# Patient Record
Sex: MALE | Race: WHITE | NOT HISPANIC OR LATINO | Employment: OTHER | URBAN - METROPOLITAN AREA
[De-identification: names, ages, dates, MRNs, and addresses within clinical notes are randomized per-mention and may not be internally consistent; named-entity substitution may affect disease eponyms.]

---

## 2017-01-25 ENCOUNTER — PREPPED CHART (OUTPATIENT)
Dept: URBAN - METROPOLITAN AREA CLINIC 43 | Facility: CLINIC | Age: 77
End: 2017-01-25

## 2018-01-31 ENCOUNTER — ESTABLISHED COMPREHENSIVE EXAM (OUTPATIENT)
Dept: URBAN - METROPOLITAN AREA CLINIC 43 | Facility: CLINIC | Age: 78
End: 2018-01-31

## 2018-01-31 DIAGNOSIS — H25.9: ICD-10-CM

## 2018-01-31 DIAGNOSIS — H35.362: ICD-10-CM

## 2018-01-31 DIAGNOSIS — H00.023: ICD-10-CM

## 2018-01-31 DIAGNOSIS — H00.026: ICD-10-CM

## 2018-01-31 PROCEDURE — G8427 DOCREV CUR MEDS BY ELIG CLIN: HCPCS

## 2018-01-31 PROCEDURE — 92015 DETERMINE REFRACTIVE STATE: CPT

## 2018-01-31 PROCEDURE — 1036F TOBACCO NON-USER: CPT

## 2018-01-31 PROCEDURE — 92014 COMPRE OPH EXAM EST PT 1/>: CPT

## 2018-01-31 ASSESSMENT — VISUAL ACUITY
OD_SC: 20/100
OS_BAT: 20/400
OD_CC: J4
OS_CC: J8
OS_CC: 20/70-2
OD_CC: 20/50-2
OS_SC: 20/100
OD_PH: 20/40-2
OD_BAT: 20/70
OS_PH: 20/60+1

## 2018-01-31 ASSESSMENT — TONOMETRY
OD_IOP_MMHG: 17
OS_IOP_MMHG: 15

## 2019-02-06 ENCOUNTER — ESTABLISHED COMPREHENSIVE EXAM (OUTPATIENT)
Dept: URBAN - METROPOLITAN AREA CLINIC 43 | Facility: CLINIC | Age: 79
End: 2019-02-06

## 2019-02-06 DIAGNOSIS — H43.813: ICD-10-CM

## 2019-02-06 DIAGNOSIS — H25.9: ICD-10-CM

## 2019-02-06 DIAGNOSIS — H02.883: ICD-10-CM

## 2019-02-06 DIAGNOSIS — H02.886: ICD-10-CM

## 2019-02-06 DIAGNOSIS — H40.013: ICD-10-CM

## 2019-02-06 DIAGNOSIS — H35.362: ICD-10-CM

## 2019-02-06 PROCEDURE — 92015 DETERMINE REFRACTIVE STATE: CPT

## 2019-02-06 PROCEDURE — 92014 COMPRE OPH EXAM EST PT 1/>: CPT

## 2019-02-06 PROCEDURE — 92133 CPTRZD OPH DX IMG PST SGM ON: CPT

## 2019-02-06 ASSESSMENT — VISUAL ACUITY
OD_CC: J3-
OS_SC: J12
OD_CC: 20/80-2
OD_BAT: 20/400
OS_CC: J8
OS_CC: 20/60-1
OD_PH: 20/50-2
OS_SC: 20/80-2
OD_SC: 20/100
OD_SC: J12-

## 2019-02-06 ASSESSMENT — TONOMETRY
OS_IOP_MMHG: 17
OD_IOP_MMHG: 18

## 2020-02-07 ENCOUNTER — ESTABLISHED COMPREHENSIVE EXAM (OUTPATIENT)
Dept: URBAN - METROPOLITAN AREA CLINIC 43 | Facility: CLINIC | Age: 80
End: 2020-02-07

## 2020-02-07 DIAGNOSIS — H40.013: ICD-10-CM

## 2020-02-07 DIAGNOSIS — H25.9: ICD-10-CM

## 2020-02-07 DIAGNOSIS — H43.813: ICD-10-CM

## 2020-02-07 DIAGNOSIS — H35.362: ICD-10-CM

## 2020-02-07 DIAGNOSIS — H02.886: ICD-10-CM

## 2020-02-07 DIAGNOSIS — H02.883: ICD-10-CM

## 2020-02-07 DIAGNOSIS — H52.03: ICD-10-CM

## 2020-02-07 PROCEDURE — 92015 DETERMINE REFRACTIVE STATE: CPT

## 2020-02-07 PROCEDURE — 92014 COMPRE OPH EXAM EST PT 1/>: CPT

## 2020-02-07 ASSESSMENT — VISUAL ACUITY
OD_SC: >J12
OS_SC: 20/200
OS_SC: >J12
OD_PH: 20/40
OS_CC: 20/60-1
OD_CC: J4
OD_CC: 20/50-2
OD_SC: 20/100
OS_CC: J6

## 2020-02-07 ASSESSMENT — TONOMETRY
OS_IOP_MMHG: 15
OD_IOP_MMHG: 16

## 2021-03-05 ENCOUNTER — ESTABLISHED COMPREHENSIVE EXAM (OUTPATIENT)
Dept: URBAN - METROPOLITAN AREA CLINIC 43 | Facility: CLINIC | Age: 81
End: 2021-03-05

## 2021-03-05 DIAGNOSIS — H35.362: ICD-10-CM

## 2021-03-05 DIAGNOSIS — H43.813: ICD-10-CM

## 2021-03-05 DIAGNOSIS — H52.03: ICD-10-CM

## 2021-03-05 DIAGNOSIS — H25.9: ICD-10-CM

## 2021-03-05 DIAGNOSIS — H40.013: ICD-10-CM

## 2021-03-05 DIAGNOSIS — H02.883: ICD-10-CM

## 2021-03-05 DIAGNOSIS — H02.886: ICD-10-CM

## 2021-03-05 PROCEDURE — 92014 COMPRE OPH EXAM EST PT 1/>: CPT

## 2021-03-05 PROCEDURE — 92015 DETERMINE REFRACTIVE STATE: CPT

## 2021-03-05 ASSESSMENT — VISUAL ACUITY
OD_CC: 20/70
OS_CC: 20/70
OD_SC: 20/200
OS_SC: >J12
OS_SC: 20/100
OD_CC: J2
OD_SC: >J12
OS_CC: J8

## 2021-03-05 ASSESSMENT — TONOMETRY
OD_IOP_MMHG: 14
OS_IOP_MMHG: 14

## 2022-01-24 NOTE — PATIENT DISCUSSION
Cataract surgery has been performed in the first eye and activities of daily living are still impaired. The patient would like to proceed with cataract surgery in the second eye as scheduled. The patient elects BV+ OS, goal of emmetropia.

## 2022-02-11 NOTE — PATIENT DISCUSSION
1 Week PO: Patient is doing well post-operatively. The importance of post-op drop compliance was emphasized. Drop schedule reviewed with patient. Patient to call if any visual changes or concerns.

## 2022-03-11 ENCOUNTER — COMPREHENSIVE EXAM (OUTPATIENT)
Dept: URBAN - METROPOLITAN AREA CLINIC 43 | Facility: CLINIC | Age: 82
End: 2022-03-11

## 2022-03-11 DIAGNOSIS — H25.9: ICD-10-CM

## 2022-03-11 DIAGNOSIS — H02.883: ICD-10-CM

## 2022-03-11 DIAGNOSIS — H02.886: ICD-10-CM

## 2022-03-11 DIAGNOSIS — H35.362: ICD-10-CM

## 2022-03-11 DIAGNOSIS — H43.813: ICD-10-CM

## 2022-03-11 DIAGNOSIS — H40.013: ICD-10-CM

## 2022-03-11 DIAGNOSIS — H52.03: ICD-10-CM

## 2022-03-11 PROCEDURE — 92014 COMPRE OPH EXAM EST PT 1/>: CPT

## 2022-03-11 PROCEDURE — 92015 DETERMINE REFRACTIVE STATE: CPT

## 2022-03-11 ASSESSMENT — TONOMETRY
OD_IOP_MMHG: 19
OS_IOP_MMHG: 18

## 2022-03-11 ASSESSMENT — VISUAL ACUITY
OS_PH: 20/70-1
OU_CC: 20/70+2
OS_CC: 20/60-1
OD_CC: 20/100-1
OD_CC: J1
OD_BAT: >20/400
OS_BAT: >20/400
OS_CC: J10
OS_SC: 20/100
OD_SC: 20/100+1
OD_PH: 20/70-1

## 2024-03-06 ENCOUNTER — COMPREHENSIVE EXAM (OUTPATIENT)
Dept: URBAN - METROPOLITAN AREA CLINIC 43 | Facility: CLINIC | Age: 84
End: 2024-03-06

## 2024-03-06 DIAGNOSIS — H40.013: ICD-10-CM

## 2024-03-06 DIAGNOSIS — H25.9: ICD-10-CM

## 2024-03-06 DIAGNOSIS — H02.886: ICD-10-CM

## 2024-03-06 DIAGNOSIS — H35.362: ICD-10-CM

## 2024-03-06 DIAGNOSIS — H43.813: ICD-10-CM

## 2024-03-06 DIAGNOSIS — H02.883: ICD-10-CM

## 2024-03-06 DIAGNOSIS — H52.03: ICD-10-CM

## 2024-03-06 PROCEDURE — 92015 DETERMINE REFRACTIVE STATE: CPT

## 2024-03-06 PROCEDURE — 92014 COMPRE OPH EXAM EST PT 1/>: CPT

## 2024-03-06 ASSESSMENT — VISUAL ACUITY
OS_SC: >J12
OS_PH: 20/60
OS_CC: J12
OU_CC: 20/50-2
OU_SC: 20/60+1
OS_BAT: 20/400
OD_SC: 20/60
OS_CC: 20/80
OS_SC: 20/200+1
OD_CC: J4
OD_SC: >J12
OD_BAT: 20/400
OU_CC: J3
OD_CC: 20/50+2

## 2024-03-06 ASSESSMENT — TONOMETRY
OS_IOP_MMHG: 15
OD_IOP_MMHG: 15

## 2025-07-03 ENCOUNTER — COMPREHENSIVE EXAM (OUTPATIENT)
Age: 85
End: 2025-07-03

## 2025-07-03 DIAGNOSIS — H25.9: ICD-10-CM

## 2025-07-03 DIAGNOSIS — H52.03: ICD-10-CM

## 2025-07-03 DIAGNOSIS — H43.813: ICD-10-CM

## 2025-07-03 DIAGNOSIS — H35.362: ICD-10-CM

## 2025-07-03 DIAGNOSIS — H35.342: ICD-10-CM

## 2025-07-03 DIAGNOSIS — H02.886: ICD-10-CM

## 2025-07-03 DIAGNOSIS — H40.013: ICD-10-CM

## 2025-07-03 DIAGNOSIS — H02.883: ICD-10-CM

## 2025-07-03 PROCEDURE — 92014 COMPRE OPH EXAM EST PT 1/>: CPT

## 2025-07-03 PROCEDURE — 92015 DETERMINE REFRACTIVE STATE: CPT

## 2025-07-03 PROCEDURE — 92134 CPTRZ OPH DX IMG PST SGM RTA: CPT

## 2025-07-09 ENCOUNTER — CONSULTATION/EVALUATION (OUTPATIENT)
Age: 85
End: 2025-07-09

## 2025-07-09 DIAGNOSIS — H43.813: ICD-10-CM

## 2025-07-09 DIAGNOSIS — H25.9: ICD-10-CM

## 2025-07-09 DIAGNOSIS — H35.362: ICD-10-CM

## 2025-07-09 DIAGNOSIS — H35.342: ICD-10-CM

## 2025-07-09 DIAGNOSIS — H40.013: ICD-10-CM

## 2025-07-09 PROCEDURE — 92250 FUNDUS PHOTOGRAPHY W/I&R: CPT

## 2025-07-09 PROCEDURE — 99214 OFFICE O/P EST MOD 30 MIN: CPT

## 2025-07-09 PROCEDURE — 92235 FLUORESCEIN ANGRPH MLTIFRAME: CPT

## 2025-07-15 ENCOUNTER — CONSULTATION/EVALUATION (OUTPATIENT)
Age: 85
End: 2025-07-15

## 2025-07-15 DIAGNOSIS — H25.813: ICD-10-CM

## 2025-07-15 DIAGNOSIS — H35.3131: ICD-10-CM

## 2025-07-15 DIAGNOSIS — H35.362: ICD-10-CM

## 2025-07-15 DIAGNOSIS — H43.822: ICD-10-CM

## 2025-07-15 DIAGNOSIS — H40.013: ICD-10-CM

## 2025-07-15 DIAGNOSIS — H35.342: ICD-10-CM

## 2025-07-15 DIAGNOSIS — H43.811: ICD-10-CM

## 2025-07-15 PROCEDURE — 92136 OPHTHALMIC BIOMETRY: CPT

## 2025-07-15 PROCEDURE — 92499PMN IMPRIMIS PRED-MOXI-NEPAF 5ML

## 2025-07-15 PROCEDURE — 99204 OFFICE O/P NEW MOD 45 MIN: CPT

## 2025-07-15 RX ORDER — PREDNISOLONE ACETATE 10 MG/ML: 1 SUSPENSION/ DROPS OPHTHALMIC TWICE A DAY

## 2025-07-22 ENCOUNTER — PRE-OP/H&P (OUTPATIENT)
Age: 85
End: 2025-07-22

## 2025-07-22 ENCOUNTER — SURGERY/PROCEDURE (OUTPATIENT)
Age: 85
End: 2025-07-22

## 2025-07-22 DIAGNOSIS — H43.811: ICD-10-CM

## 2025-07-22 DIAGNOSIS — H25.813: ICD-10-CM

## 2025-07-22 DIAGNOSIS — H35.3131: ICD-10-CM

## 2025-07-22 DIAGNOSIS — H40.013: ICD-10-CM

## 2025-07-22 DIAGNOSIS — H43.822: ICD-10-CM

## 2025-07-22 DIAGNOSIS — H35.342: ICD-10-CM

## 2025-07-22 DIAGNOSIS — H35.362: ICD-10-CM

## 2025-07-22 PROCEDURE — 99211T TECH SERVICE

## 2025-07-22 PROCEDURE — 66984 XCAPSL CTRC RMVL W/O ECP: CPT

## 2025-07-23 ENCOUNTER — POST-OP (OUTPATIENT)
Age: 85
End: 2025-07-23

## 2025-07-23 DIAGNOSIS — Z96.1: ICD-10-CM

## 2025-08-06 ENCOUNTER — POST-OP (OUTPATIENT)
Age: 85
End: 2025-08-06

## 2025-08-06 DIAGNOSIS — H35.342: ICD-10-CM

## 2025-08-06 DIAGNOSIS — H35.3131: ICD-10-CM

## 2025-08-06 DIAGNOSIS — Z96.1: ICD-10-CM

## 2025-08-06 PROCEDURE — 92134 CPTRZ OPH DX IMG PST SGM RTA: CPT

## 2025-08-06 PROCEDURE — 99024 POSTOP FOLLOW-UP VISIT: CPT

## 2025-08-20 ENCOUNTER — FOLLOW UP (OUTPATIENT)
Age: 85
End: 2025-08-20

## 2025-08-20 DIAGNOSIS — H35.342: ICD-10-CM

## 2025-08-20 DIAGNOSIS — H43.822: ICD-10-CM

## 2025-08-20 DIAGNOSIS — H43.811: ICD-10-CM

## 2025-08-20 DIAGNOSIS — H35.362: ICD-10-CM

## 2025-08-20 DIAGNOSIS — Z96.1: ICD-10-CM

## 2025-08-20 DIAGNOSIS — H40.013: ICD-10-CM

## 2025-08-20 DIAGNOSIS — H25.9: ICD-10-CM

## 2025-08-20 DIAGNOSIS — H35.3131: ICD-10-CM

## 2025-08-20 PROCEDURE — 99215 OFFICE O/P EST HI 40 MIN: CPT | Mod: 24

## 2025-08-20 PROCEDURE — 92134 CPTRZ OPH DX IMG PST SGM RTA: CPT

## 2025-08-20 RX ORDER — ERYTHROMYCIN 5 MG/G: OINTMENT OPHTHALMIC EVERY EVENING

## 2025-08-20 RX ORDER — MOXIFLOXACIN OPHTHALMIC 5 MG/ML: 1 SOLUTION/ DROPS OPHTHALMIC
